# Patient Record
Sex: FEMALE | Race: BLACK OR AFRICAN AMERICAN
[De-identification: names, ages, dates, MRNs, and addresses within clinical notes are randomized per-mention and may not be internally consistent; named-entity substitution may affect disease eponyms.]

---

## 2019-07-29 ENCOUNTER — HOSPITAL ENCOUNTER (OUTPATIENT)
Dept: HOSPITAL 10 - SDS | Age: 69
Discharge: HOME | End: 2019-07-29
Attending: ORTHOPAEDIC SURGERY
Payer: COMMERCIAL

## 2019-07-29 ENCOUNTER — HOSPITAL ENCOUNTER (OUTPATIENT)
Dept: HOSPITAL 91 - SDS | Age: 69
Discharge: HOME | End: 2019-07-29
Payer: COMMERCIAL

## 2019-07-29 VITALS — DIASTOLIC BLOOD PRESSURE: 74 MMHG | HEART RATE: 72 BPM | RESPIRATION RATE: 20 BRPM | SYSTOLIC BLOOD PRESSURE: 159 MMHG

## 2019-07-29 VITALS — SYSTOLIC BLOOD PRESSURE: 150 MMHG | DIASTOLIC BLOOD PRESSURE: 72 MMHG | HEART RATE: 70 BPM | RESPIRATION RATE: 15 BRPM

## 2019-07-29 VITALS — HEART RATE: 62 BPM | RESPIRATION RATE: 16 BRPM | SYSTOLIC BLOOD PRESSURE: 156 MMHG | DIASTOLIC BLOOD PRESSURE: 72 MMHG

## 2019-07-29 VITALS — SYSTOLIC BLOOD PRESSURE: 167 MMHG | RESPIRATION RATE: 14 BRPM | DIASTOLIC BLOOD PRESSURE: 74 MMHG | HEART RATE: 58 BPM

## 2019-07-29 VITALS
WEIGHT: 130.29 LBS | WEIGHT: 130.29 LBS | HEIGHT: 63 IN | BODY MASS INDEX: 23.09 KG/M2 | BODY MASS INDEX: 23.09 KG/M2 | HEIGHT: 63 IN

## 2019-07-29 VITALS — RESPIRATION RATE: 14 BRPM | SYSTOLIC BLOOD PRESSURE: 150 MMHG | DIASTOLIC BLOOD PRESSURE: 64 MMHG | HEART RATE: 70 BPM

## 2019-07-29 VITALS — HEART RATE: 64 BPM | RESPIRATION RATE: 18 BRPM | SYSTOLIC BLOOD PRESSURE: 145 MMHG | DIASTOLIC BLOOD PRESSURE: 68 MMHG

## 2019-07-29 VITALS — HEART RATE: 64 BPM | SYSTOLIC BLOOD PRESSURE: 170 MMHG | RESPIRATION RATE: 14 BRPM | DIASTOLIC BLOOD PRESSURE: 87 MMHG

## 2019-07-29 VITALS — RESPIRATION RATE: 13 BRPM | HEART RATE: 64 BPM | SYSTOLIC BLOOD PRESSURE: 153 MMHG | DIASTOLIC BLOOD PRESSURE: 68 MMHG

## 2019-07-29 VITALS — SYSTOLIC BLOOD PRESSURE: 167 MMHG | HEART RATE: 60 BPM | DIASTOLIC BLOOD PRESSURE: 73 MMHG | RESPIRATION RATE: 23 BRPM

## 2019-07-29 VITALS — DIASTOLIC BLOOD PRESSURE: 80 MMHG | RESPIRATION RATE: 17 BRPM | HEART RATE: 64 BPM | SYSTOLIC BLOOD PRESSURE: 169 MMHG

## 2019-07-29 VITALS — RESPIRATION RATE: 18 BRPM | HEART RATE: 69 BPM | DIASTOLIC BLOOD PRESSURE: 76 MMHG | SYSTOLIC BLOOD PRESSURE: 132 MMHG

## 2019-07-29 VITALS — HEART RATE: 60 BPM | SYSTOLIC BLOOD PRESSURE: 175 MMHG | DIASTOLIC BLOOD PRESSURE: 76 MMHG | RESPIRATION RATE: 13 BRPM

## 2019-07-29 VITALS — RESPIRATION RATE: 16 BRPM | SYSTOLIC BLOOD PRESSURE: 164 MMHG | HEART RATE: 62 BPM | DIASTOLIC BLOOD PRESSURE: 74 MMHG

## 2019-07-29 VITALS — RESPIRATION RATE: 16 BRPM | HEART RATE: 66 BPM | SYSTOLIC BLOOD PRESSURE: 133 MMHG | DIASTOLIC BLOOD PRESSURE: 67 MMHG
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
## 2019-07-29 DIAGNOSIS — M22.41: ICD-10-CM

## 2019-07-29 DIAGNOSIS — S83.271A: Primary | ICD-10-CM

## 2019-07-29 PROCEDURE — 71045 X-RAY EXAM CHEST 1 VIEW: CPT

## 2019-07-29 PROCEDURE — 29881 ARTHRS KNE SRG MNISECTMY M/L: CPT

## 2019-07-29 RX ADMIN — KETOROLAC TROMETHAMINE 1 ML: 30 INJECTION, SOLUTION INTRAMUSCULAR at 12:29

## 2019-07-29 RX ADMIN — ONDANSETRON HYDROCHLORIDE 1 MG: 2 INJECTION, SOLUTION INTRAMUSCULAR; INTRAVENOUS at 14:03

## 2019-07-29 RX ADMIN — MEPERIDINE HYDROCHLORIDE 1 MG: 25 INJECTION, SOLUTION INTRAMUSCULAR; INTRAVENOUS; SUBCUTANEOUS at 14:02

## 2019-07-29 RX ADMIN — POVIDONE-IODINE 1 APPLIC: 100 OINTMENT TOPICAL at 12:29

## 2019-07-29 RX ADMIN — THIAMINE HYDROCHLORIDE 1 MLS/HR: 100 INJECTION, SOLUTION INTRAMUSCULAR; INTRAVENOUS at 11:59

## 2019-07-29 NOTE — HPN
Date/Time of Note


Date/Time of Note


DATE: 7/29/19 


TIME: 11:59





Interval H&P Admission Note


Pt. seen H&P reviewed:  No system changes











JASSON BATES MD            Jul 29, 2019 11:59

## 2019-07-29 NOTE — PAC
Date/Time of Note


Date/Time of Note


DATE: 7/29/19 


TIME: 13:57





Post-Anesthesia Notes


Post-Anesthesia Note


Last documented vital signs





Vital Signs


  Date      Temp  Pulse  Resp  B/P (MAP)   Pulse Ox  O2          O2 Flow    FiO2


Time                                                 Delivery    Rate


   7/29/19  97.7     66    16      133/67       100  Room Air


     10:13                           (89)





Activity:  WNL


Respiratory function:  WNL


Cardiovascular function:  WNL


Mental status:  Baseline


Pain reasonably controlled:  Yes


Hydration appropriate:  Yes


Nausea/Vomiting absent:  Yes











Raad Infante M.D.      Jul 29, 2019 13:57

## 2019-07-29 NOTE — OPR
DATE OF OPERATION:  07/29/2019

 

 

PREOPERATIVE DIAGNOSES:

1.  Tear of lateral meniscus, right knee.

2.  Chondromalacia of the knee.

 

POSTOPERATIVE DIAGNOSES:

1.  Complex tear lateral meniscus, right knee.

2.  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
 medial femoral condyle and medial tibial plateau.

 

OPERATION PERFORMED:

1.  Arthroscopy of the right knee.

2.  Partial lateral meniscectomy.

3.  Chondroplasty medial femoral condyle and patella.

 

SURGEON:  Jasson Bates MD

 

FIRST ASSISTANT:  None.

 

ANESTHESIA:  General.

 

TOURNIQUET TIME:  Zero.

 

DESCRIPTION OF PROCEDURE:  The patient was taken to the operating room and placed in supine position.
  Satisfactory general anesthesia was administered, 2 grams Ancef intravenously.  The right leg was p
repped and draped in the usual manner.  Exam under anesthesia revealed full range of motion, AP drawe
r and Lachman 1+, pivot shift negative.  No varus valgus instability was a large effusion.

 

Standard arthroscopic portals were used.  Undersurface of the patella had some grade I, slight grade 
II, chondromalacia, trochlear groove were relatively smooth.  Medial synovial shelf was normal.  Late
ral gutter had no loose bodies.  Popliteus was intact.  Lateral compartment was entered.  There was a
 complex tear of the lateral meniscus from the anterior horn to the posterior horn, some grade II cho
ndromalacia of the lateral compartment.  Anterior and posterior cruciates were intact, origins and in
sertions with a large thickened ligamentum mucosum as over the medial compartment was entered.  There
 was grade II chondromalacia medial femoral condyle.  The medial compartment was entered.  The medial
 meniscus looked intact, but there was an area of wear grade II, slight grade III chondromalacia medi
al femoral condyle and medial tibial plateau, grade I to II chondromalacia.  Probe inserted through t
he medial portal.  The meniscus was palpated.  No tears were seen.  

 

Chondroplasty performed along the medial compartment.  Ligamentum mucosum was excised with electrosur
claude.  The anterior and posterior cruciates were intact, origins and insertions.  Lateral compartment
 was entered.  Lateral meniscus tear was somewhat degenerative in nature.  Using curved and straight 
baskets it was saucerized in the anterior horn to the posterior horn.  We were able to leave a fair a
mount of meniscus still from the popliteus was degeneration of the inferior leaf, which was resected.
  All loose debris was removed.  The shaver was used to smooth and contour the edges and remove all l
oose bodies.  There was grade II chondromalacia lateral compartment.  Chondroplasty performed along t
he lateral compartment.  Chondroplasty then performed along the patella.  The knee was irrigated prem
r. 

 

Wounds closed with Steri-Strips, infiltrated with 0.5% ropivacaine.  Compression dressing was applied
 and the patient brought to the recovery room in stable condition.  Interprocedure sponge and needle 
count was correct.  Patient tolerated the procedure well.

 

 

Dictated By: JASSON BATES MD

 

RF/DEANNA

DD:    07/29/2019 14:00:18

DT:    07/29/2019 15:50:41

Conf#: [id number]

DID#:  [id number]

## 2019-07-29 NOTE — PREAC
Date/Time of Note


Date/Time of Note


DATE: 7/29/19 


TIME: 11:42





Anesthesia Eval and Record


Evaluation


Time Pre-Procedure Interview


DATE: 7/29/19 


TIME: 11:42


Age


69


Sex


female


NPO:  8 hrs


Preoperative diagnosis


RIGHT KNEE LATERAL MENISCUS TEAR


Planned procedure


RIGHT KNEE SCOPE, PARTIAL LATERAL MENISCECTOMY





Past Medical History


Past Medical History:  Includes (ALLERGIC RHINITIS)


Endo:  Hyperthyroid





Surgery & Anesthesia Issues


No known issue





Meds


Anticoagulation:  No


Beta Blocker within 24 hr:  No


Reason Beta Blocker not given:  Pt. not on B-Blocker


No Active Prescriptions or Reported Meds





Current Medications


Lactated Ringer's 1,000 ml @  0 mls/hr Q0M IV ;  Start 7/29/19 at 10:30


Meds reviewed:  Yes





Allergies


Coded Allergies:  


     No Known Allergy (Unverified , 7/29/19)


Allergies Reviewed:  Yes





Labs/Studies


Labs Reviewed:  Reviewed by anesthesiologist


Pregnancy test:  N/A


Studies:  ECG (NSR AT 63 BPM), CXR (NAPD)





Pre-procedure Exam


Last vitals





Vital Signs


  Date      Temp  Pulse  Resp  B/P (MAP)   Pulse Ox  O2          O2 Flow    FiO2


Time                                                 Delivery    Rate


   7/29/19  97.7     66    16      133/67       100  Room Air


     10:13                           (89)





Airway:  Adequate mouth opening, Adequate thyromental dist


Mallampati:  Mallampati II


Teeth:  Normal


Lung:  Normal


Heart:  Normal





ASA Physical Status


ASA physical status:  2


Emergency:  None





Planned Pain Management


Parenteral pain med





Pre-operative Attestations


Prior to commencing anesthesia and surgery, the patient was re-evaluated, there 


was verification of:


*The patient's identity


*The results of appropriate recent lab work and preoperative vital signs


*The above evaluation not changing prior to induction


*Anesthetic plan, risk benefits, alternative and complications discussed with 


patient/family; questions answered; patient/family understands, accepts and 


wishes to proceed.











Raad Infante M.D.      Jul 29, 2019 11:49

## 2019-07-29 NOTE — OPPN
Date/Time of Note


Date/Time of Note


DATE: 7/29/19 


TIME: 13:48





Operative Report


Preoperative Diagnosis


Right knee lateral meniscus tear


Postoperative Diagnosis


Right knee lateral meniscus tear


Operation/Procedure Performed


Right knee OPA, partial lateral meniscectomy, chondroplasty


Surgeon


see signature line


First assist


none


Anesthesia:  general, other


Estimated blood loss:  minimal


Transfusion Required


   none


Specimen


none


Grafts/Implants


none


Complications


none











JASSON BATES MD            Jul 29, 2019 13:49